# Patient Record
(demographics unavailable — no encounter records)

---

## 2025-05-23 NOTE — REASON FOR VISIT
[CV Risk Factors and Non-Cardiac Disease] : CV risk factors and non-cardiac disease [FreeTextEntry1] :  Subjective:   - Summary: 47-year-old female presenting with health anxiety and family history of cardiac issues. Patient reports being less active in recent years, with weight gain and increased alcohol consumption. No current chest pain or significant shortness of breath.   - Chief Complaint (CC): Health anxiety and family history of cardiac issues   - History of Present Illness (HPI): Ms. Murray reports health anxiety related to cardiac issues. She has become less active over the past five years, gaining about 30 pounds. She experiences shortness of breath after walking five blocks or climbing two flights of stairs, which has been consistent for the past five years. Patient notes lifestyle changes post-pandemic, including increased alcohol consumption and decreased physical activity. She can currently walk on a treadmill for 13 minutes without significant issues. Patient has a history of visiting a cardiologist about five years ago due to abnormal EKG findings but did not follow up with recommended heart monitor due to COVID-19 pandemic and fear of doctors.   - Past Medical History: No known history of hypertension or diabetes   - Past Surgical History:    - Family History: Father passed away from congestive heart failure at age 72. Paternal aunt had a heart attack in her 30s. Maternal aunt had a heart attack in her early 50s. Family history of diabetes.   - Social History: Former smoker (quit years ago). Recent increase in alcohol consumption. Sedentary lifestyle with minimal exercise.   - Review of Systems: Cardiovascular: Denies chest pain. Reports mild shortness of breath with exertion. Musculoskeletal: Reports feet swelling.   - Medications: None reported   - Allergies: None reported   Objective:   - Diagnostic Results: Previous EKG showed some abnormal findings (details not specified)   - Vital Signs:    - Physical Examination (PE): General: Alert and oriented, appears anxious. Cardiovascular: Heart sounds normal on auscultation. No murmurs, rubs, or gallops noted. Extremities: No ankle edema observed.   Assessment:   - Summary: 45-year-old female with health anxiety, family history of cardiac disease, and sedentary lifestyle. Current examination and reported symptoms suggest low to medium cardiac risk. Main issues are health anxiety and need for lifestyle modifications.   - Problems:     - Health anxiety     - Family history of cardiac disease     - Sedentary lifestyle     - Weight gain     - Increased alcohol consumption   - Differential Diagnosis:     - Anxiety disorder     - Deconditioning     - Early stage cardiovascular disease   Plan:   - Summary: Comprehensive cardiac evaluation to address health anxiety and assess current cardiac health. Emphasis on lifestyle modifications including diet and exercise.   - Plan:     - Order stress echocardiogram to evaluate cardiac function and rule out any significant cardiac issues     - Complete blood work including lipid panel, thyroid function tests, and diabetes screening     - Recommend gradual increase in exercise, aiming for 30-45 minutes of moderate activity 5 days a week     - Advise on Mediterranean diet for heart health     - Consider referral for management of health anxiety if symptoms persist after cardiac evaluation     - Follow-up after test results to discuss findings and further management

## 2025-05-23 NOTE — DISCUSSION/SUMMARY
[FreeTextEntry1] : 1.cad- high health anxiety, fhx of cad, exercising semi regularly  stress echo  2.chol- check bloods   ekg normal